# Patient Record
Sex: MALE | Race: WHITE | NOT HISPANIC OR LATINO | Employment: OTHER | URBAN - METROPOLITAN AREA
[De-identification: names, ages, dates, MRNs, and addresses within clinical notes are randomized per-mention and may not be internally consistent; named-entity substitution may affect disease eponyms.]

---

## 2020-10-04 ENCOUNTER — HOSPITAL ENCOUNTER (EMERGENCY)
Facility: CLINIC | Age: 75
Discharge: HOME | End: 2020-10-04
Attending: EMERGENCY MEDICINE | Admitting: EMERGENCY MEDICINE
Payer: COMMERCIAL

## 2020-10-04 ENCOUNTER — APPOINTMENT (OUTPATIENT)
Dept: MRI IMAGING | Facility: CLINIC | Age: 75
End: 2020-10-04
Attending: EMERGENCY MEDICINE
Payer: COMMERCIAL

## 2020-10-04 VITALS
TEMPERATURE: 97.7 F | SYSTOLIC BLOOD PRESSURE: 139 MMHG | OXYGEN SATURATION: 93 % | RESPIRATION RATE: 15 BRPM | HEART RATE: 82 BPM | DIASTOLIC BLOOD PRESSURE: 78 MMHG

## 2020-10-04 DIAGNOSIS — R42 VERTIGO: ICD-10-CM

## 2020-10-04 LAB
ANION GAP SERPL CALCULATED.3IONS-SCNC: 6 MMOL/L (ref 3–14)
BUN SERPL-MCNC: 25 MG/DL (ref 7–30)
CALCIUM SERPL-MCNC: 8.6 MG/DL (ref 8.5–10.1)
CHLORIDE SERPL-SCNC: 106 MMOL/L (ref 94–109)
CO2 SERPL-SCNC: 27 MMOL/L (ref 20–32)
CREAT SERPL-MCNC: 1.05 MG/DL (ref 0.66–1.25)
ERYTHROCYTE [DISTWIDTH] IN BLOOD BY AUTOMATED COUNT: 11.6 % (ref 10–15)
GFR SERPL CREATININE-BSD FRML MDRD: 69 ML/MIN/{1.73_M2}
GLUCOSE SERPL-MCNC: 141 MG/DL (ref 70–99)
HCT VFR BLD AUTO: 43.5 % (ref 40–53)
HGB BLD-MCNC: 14.6 G/DL (ref 13.3–17.7)
MCH RBC QN AUTO: 30.4 PG (ref 26.5–33)
MCHC RBC AUTO-ENTMCNC: 33.6 G/DL (ref 31.5–36.5)
MCV RBC AUTO: 90 FL (ref 78–100)
PLATELET # BLD AUTO: 176 10E9/L (ref 150–450)
POTASSIUM SERPL-SCNC: 4 MMOL/L (ref 3.4–5.3)
RBC # BLD AUTO: 4.81 10E12/L (ref 4.4–5.9)
SODIUM SERPL-SCNC: 137 MMOL/L (ref 133–144)
WBC # BLD AUTO: 10.5 10E9/L (ref 4–11)

## 2020-10-04 PROCEDURE — 99285 EMERGENCY DEPT VISIT HI MDM: CPT | Mod: 25

## 2020-10-04 PROCEDURE — 80048 BASIC METABOLIC PNL TOTAL CA: CPT | Performed by: EMERGENCY MEDICINE

## 2020-10-04 PROCEDURE — 250N000013 HC RX MED GY IP 250 OP 250 PS 637: Performed by: EMERGENCY MEDICINE

## 2020-10-04 PROCEDURE — 70549 MR ANGIOGRAPH NECK W/O&W/DYE: CPT

## 2020-10-04 PROCEDURE — 93005 ELECTROCARDIOGRAM TRACING: CPT

## 2020-10-04 PROCEDURE — 70553 MRI BRAIN STEM W/O & W/DYE: CPT | Mod: 59

## 2020-10-04 PROCEDURE — 96361 HYDRATE IV INFUSION ADD-ON: CPT

## 2020-10-04 PROCEDURE — 96374 THER/PROPH/DIAG INJ IV PUSH: CPT

## 2020-10-04 PROCEDURE — 85027 COMPLETE CBC AUTOMATED: CPT | Performed by: EMERGENCY MEDICINE

## 2020-10-04 PROCEDURE — 250N000011 HC RX IP 250 OP 636: Performed by: EMERGENCY MEDICINE

## 2020-10-04 PROCEDURE — A9585 GADOBUTROL INJECTION: HCPCS | Performed by: EMERGENCY MEDICINE

## 2020-10-04 PROCEDURE — 70544 MR ANGIOGRAPHY HEAD W/O DYE: CPT

## 2020-10-04 PROCEDURE — 255N000002 HC RX 255 OP 636: Performed by: EMERGENCY MEDICINE

## 2020-10-04 PROCEDURE — 96375 TX/PRO/DX INJ NEW DRUG ADDON: CPT

## 2020-10-04 PROCEDURE — 36415 COLL VENOUS BLD VENIPUNCTURE: CPT | Performed by: EMERGENCY MEDICINE

## 2020-10-04 PROCEDURE — 258N000003 HC RX IP 258 OP 636: Performed by: EMERGENCY MEDICINE

## 2020-10-04 RX ORDER — GADOBUTROL 604.72 MG/ML
10 INJECTION INTRAVENOUS ONCE
Status: COMPLETED | OUTPATIENT
Start: 2020-10-04 | End: 2020-10-04

## 2020-10-04 RX ORDER — MECLIZINE HYDROCHLORIDE 25 MG/1
25 TABLET ORAL ONCE
Status: COMPLETED | OUTPATIENT
Start: 2020-10-04 | End: 2020-10-04

## 2020-10-04 RX ORDER — ONDANSETRON 4 MG/1
4 TABLET, ORALLY DISINTEGRATING ORAL EVERY 6 HOURS PRN
Qty: 15 TABLET | Refills: 0 | Status: SHIPPED | OUTPATIENT
Start: 2020-10-04 | End: 2020-10-07

## 2020-10-04 RX ORDER — ONDANSETRON 2 MG/ML
8 INJECTION INTRAMUSCULAR; INTRAVENOUS ONCE
Status: COMPLETED | OUTPATIENT
Start: 2020-10-04 | End: 2020-10-04

## 2020-10-04 RX ORDER — MECLIZINE HYDROCHLORIDE 25 MG/1
25 TABLET ORAL 3 TIMES DAILY PRN
Qty: 15 TABLET | Refills: 0 | Status: SHIPPED | OUTPATIENT
Start: 2020-10-04

## 2020-10-04 RX ORDER — LORAZEPAM 2 MG/ML
1 INJECTION INTRAMUSCULAR ONCE
Status: COMPLETED | OUTPATIENT
Start: 2020-10-04 | End: 2020-10-04

## 2020-10-04 RX ADMIN — GADOBUTROL 10 ML: 604.72 INJECTION INTRAVENOUS at 12:24

## 2020-10-04 RX ADMIN — MECLIZINE HYDROCHLORIDE 25 MG: 25 TABLET ORAL at 11:26

## 2020-10-04 RX ADMIN — ONDANSETRON 8 MG: 2 INJECTION INTRAMUSCULAR; INTRAVENOUS at 11:06

## 2020-10-04 RX ADMIN — SODIUM CHLORIDE 1000 ML: 9 INJECTION, SOLUTION INTRAVENOUS at 11:06

## 2020-10-04 RX ADMIN — LORAZEPAM 1 MG: 2 INJECTION INTRAMUSCULAR; INTRAVENOUS at 11:07

## 2020-10-04 ASSESSMENT — ENCOUNTER SYMPTOMS
NAUSEA: 1
VOMITING: 1
DIARRHEA: 0
COUGH: 0

## 2020-10-04 NOTE — ED TRIAGE NOTES
A&O x4, ABCs intact. Pt presents with SOB and dizziness that started at 0700 this morning. Pt reports throwing up around 8. Pt noted to be hyperventilating in triage. Pt encouraged to slow breathing. Pt denies chest pain.     Spine appears normal, range of motion is not limited, no muscle or joint tenderness

## 2020-10-04 NOTE — ED PROVIDER NOTES
History     Chief Complaint:  Dizziness     HPI   Kevin Alfonso is a 75 year old male who presents with primary complaints of dizziness.  He reports that he awoke around 8 AM this morning with dizziness.  He describes his dizziness as the room is spinning.  The dizziness is exacerbated by extraocular movement and position change.  He became nauseated and vomited.  He denies any associated numbness, weakness, vision changes, problems swallowing or speaking.  His symptoms have been constant although aggravated with movement.  Nurse's triage notes remarked that the patient is short of breath.  He denies dyspnea to me but states that he was breathing heavier due to the dizziness.  He denies any new hearing loss tinnitus, recent head or neck trauma, chiropractor manipulation of the neck..    Allergies:  Atorvastatin  Codeine  Dextrin  Lactose     Medications:    Zocor    Past Medical History:    Hyperlipidemia   BPH without urinary obstruction  Mitral late systolic murmur  Colonic polyps  Diverticulosis of colon  Macular degeneration    Past Surgical History:    Hernia repair      Family History:    Cancer  Hyperlipidemia      Social History:  Smoking Status: Never Smoker  Smokeless Tobacco: Never Used  Alcohol Use: No    Review of Systems   Respiratory: Negative for cough.    Cardiovascular: Negative for chest pain.   Gastrointestinal: Positive for nausea and vomiting. Negative for diarrhea.   Skin: Negative for rash.   All other systems reviewed and are negative.      Physical Exam     Patient Vitals for the past 24 hrs:   BP Temp Temp src Pulse Resp SpO2   10/04/20 1250 -- -- -- 82 15 93 %   10/04/20 1245 -- -- -- 83 21 93 %   10/04/20 1240 -- -- -- 75 15 92 %   10/04/20 1235 -- -- -- 73 16 --   10/04/20 1130 139/78 -- -- -- -- --   10/04/20 1120 -- -- -- 84 -- --   10/04/20 1115 (!) 141/79 -- -- 92 -- --   10/04/20 1110 -- -- -- 80 -- --   10/04/20 1105 -- -- -- 80 -- --   10/04/20 1100 128/69 -- -- 74 -- --    10/04/20 1050 -- -- -- 73 -- 97 %   10/04/20 1000 (!) 145/74 -- -- 63 20 96 %   10/04/20 0947 121/79 97.7  F (36.5  C) Temporal 72 20 100 %        Physical Exam    Constitutional:  Pleasant, age appropriate male lying still in the bed with his eyes closed  HEENT:    Tympanic membranes are clear and without effusion.     External auditory canals without cerumen impaction.     Oropharynx is moist, without lesions or trismus.  Eyes:    Conjunctiva normal, PERRL     Extra-ocular movements intact.     No nystagmus  Neck:    Supple, no meningismus.     CV:     Regular rate and rhythm.      No murmurs, rubs or gallops.     2+ radial pulses bilateral.       No lower extremity edema.  PULM:    Clear to auscultation bilateral.       No respiratory distress.      Good air exchange.     No rales or wheezing.  ABD:    Soft, non-tender, non-distended.       No pulsatile masses.       No rebound, guarding or rigidity.  MSK:     No gross deformity to all four extremities.   LYMPH:   No cervical lymphadenopathy.  NEURO:   Alert and oriented x 3.      Cranial nerves II-XII intact.     Strength is 5/5 in all 4 extremities.     Sensation intact to all 4 extremities.     Finger to nose normal bilateral     No clonus, down-going Babinski bilateral.     Test of skew normal.  Skin:    Warm, dry and intact.    Psych:    Mildly anxious      Emergency Department Course     ECG:  ECG taken at 0959  Sinus rhythm with sinus arrhythmia with 1st degree A-V block  Prolonged QT  Abnormal ECG  No previous ECGs available  Vent. rate 63 BPM  OH interval 214 ms  QRS duration 102 ms  QT/QTc 488/499 ms  P-R-T axes 46 61 55    Imaging:  Radiology findings were communicated with the patient who voiced understanding of the findings.    MR Brain w/o & w Contrast  Diffuse cerebral volume loss and cerebral white matter  changes consistent with chronic small vessel ischemic disease. No  evidence for acute intracranial pathology.  Reading per radiology    MRA  Neck (Carotids) wo & w Contrast  Normal MR angiogram of the neck.   Reading per radiology    MRA Brain ([place]) wo Contrast   Normal MR angiogram of the head.    Reading per radiology     Laboratory:  Laboratory findings were communicated with the patient who voiced understanding of the findings.    CBC: WBC 10.5, HGB 14.6,   BMP:  (H) o/w WNL (Creatinine 1.05)    Procedures    Interventions:  1107 Ativan 1 mg IV   Zofran 8 mg IV    NS 1 L IV  1126 Antivert 25 mg Oral     Emergency Department Course:    0949 Nursing notes and vitals reviewed.     I performed an exam of the patient as documented above.     1130 IV was inserted and blood was drawn for laboratory testing, results above.     1134 The patient was sent for a MR while in the emergency department, results above.      1255 Patient now without residual dizziness. Gait is stable.     Findings and plan explained to the Patient. Patient discharged home with instructions regarding supportive care, medications, and reasons to return. The importance of close follow-up was reviewed.     Impression & Plan          Medical Decision Making:  Kevin Alfonso is a 75 year old male who presents to the emergency department today for evaluation of dizziness.  His evaluation was most consistent with benign positional peripheral vertigo but given advanced age, laboratory studies were undertaken as well as MRI/MRA of the head and neck.  There is no acute pathology noted on radiographic studies.  Patient felt remarkably improved with interventions as described above.  Patient safe for discharge home with PRN meclizine and Zofran.  Close follow-up with PCP and return to the ED for any worsening symptoms.      Diagnosis:    ICD-10-CM    1. Vertigo  R42      Disposition:   The patient is discharged to home.     Discharge Medications:  New Prescriptions    MECLIZINE (ANTIVERT) 25 MG TABLET    Take 1 tablet (25 mg) by mouth 3 times daily as needed for dizziness     ONDANSETRON (ZOFRAN ODT) 4 MG ODT TAB    Take 1 tablet (4 mg) by mouth every 6 hours as needed for nausea       Scribe Disclosure:  I, Hebert Crook, am serving as a scribe at 9:50 AM on 10/4/2020 to document services personally performed by Geoffrey Zambrano MD based on my observations and the provider's statements to me.        Geoffrey Zambrano MD  10/06/20 5812

## 2020-10-04 NOTE — DISCHARGE INSTRUCTIONS
Please make an appointment to follow up with your primary care provider in 3-5 days even if entirely better.      Discharge Instructions  Vertigo  You have been diagnosed with vertigo.  This is a dizzy feeling often described as spinning or that the room is moving around you. You will often have nausea (sick to your stomach), vomiting (throwing up), and balance problems with it.  Vertigo is usually caused by a problem in the inner ear which helps control your balance.  Many things can cause vertigo, including calcium collections in the inner ear, a virus infection of the inner ear, concussion, migraine, and some medicines.  Luckily, these causes are not life threatening and will eventually go away.  However, sometimes there is a serious problem that does not show up right away.  Generally, every Emergency Department visit should have a follow-up clinic visit with either a primary or a specialty clinic/provider. Please follow-up as instructed by your emergency provider today.  Return to the Emergency Department if you have:  New or severe headache.  Double vision (seeing two of things).  Trouble speaking or hearing.  Weakness or trouble moving/using one side of your body.  Passing out.  Numbness or tingling.  Chest pain.  Vomiting that will not stop.    Treatment:  There are several commonly prescribed medications:  Antihistamines such as meclizine (Antivert ), dimenhydrinate (Dramamine ), or diphenhydramine (Benadryl ).  Prescription anti-nausea medicines, such as promethazine (Phenergan ), metoclopramide (Reglan ), or ondansetron (Zofran ).  Prescription sedative medicines, such as diazepam (Valium ), lorazepam (Ativan ), or clonazepam (Klonopin ).  Most of these medicines make you sleepy, and you should not take them before you work or drive. You should only take prescription medicines to treat severe vertigo symptoms, and you should stop the medicine when your symptoms improve.    Follow Up:  If you have vertigo  longer than three days, it is important that you follow up either with your primary provider or an Ear, Nose, and Throat (ENT) specialist.  You may need further testing to evaluate your vertigo and you may also need  vestibular  therapy which is a special form of physical therapy to make the vertigo go away.    If you were given a prescription for medicine here today, be sure to read all of the information (including the package insert) that comes with your prescription.  This will include important information about the medicine, its side effects, and any warnings that you need to know about.  The pharmacist who fills the prescription can provide more information and answer questions you may have about the medicine.  If you have questions or concerns that the pharmacist cannot address, please call or return to the Emergency Department.     Remember that you can always come back to the Emergency Department if you are not able to see your regular provider in the amount of time listed above, if you get any new symptoms, or if there is anything that worries you.

## 2020-10-04 NOTE — ED AVS SNAPSHOT
[facility] Emergency Dept  [address]  [facility] [address]  Phone: [phone]  Fax: [phone]                                    Kevin Alfonso   MRN: [id number]    Department: [facility] Emergency Dept   Date of Visit: 10/4/2020           After Visit Summary Signature Page    I have received my discharge instructions, and my questions have been answered. I have discussed any challenges I see with this plan with the nurse or doctor.    ..........................................................................................................................................  Patient/Patient Representative Signature      ..........................................................................................................................................  Patient Representative Print Name and Relationship to Patient    ..................................................               ................................................  Date                                   Time    ..........................................................................................................................................  Reviewed by Signature/Title    ...................................................              ..............................................  Date                                               Time          22EPIC Rev 08/18

## 2020-10-04 NOTE — ED NOTES
Pt ambulated independently without assistance. Pt reports no increase in shortness of breath or dizziness while walking. MD notified

## 2020-10-05 LAB — INTERPRETATION ECG - MUSE: NORMAL

## 2023-04-08 ENCOUNTER — HOSPITAL ENCOUNTER (EMERGENCY)
Facility: CLINIC | Age: 78
Discharge: LEFT BEFORE DISCHARGE | End: 2023-04-08
Payer: COMMERCIAL

## 2023-04-08 VITALS
OXYGEN SATURATION: 98 % | TEMPERATURE: 98.1 F | RESPIRATION RATE: 18 BRPM | SYSTOLIC BLOOD PRESSURE: 123 MMHG | HEART RATE: 84 BPM | DIASTOLIC BLOOD PRESSURE: 88 MMHG

## 2023-04-08 NOTE — ED NOTES
Patient's daughter up to desk asking if they need to stay, writer explained that if they would like to see a doctor they would have to wait, daughter continued to ask if they needed to be here, writer stated that it was up to them if they wanted to be seen or not.

## 2023-04-08 NOTE — ED TRIAGE NOTES
Patient states he has had a rapid heart rate since 8am. Rate is 83 in triage, patient states when he took it at home it was between 89 and 93. Patient denies chest pain, palpitations, and SOB     Triage Assessment     Row Name 04/08/23 1123       Triage Assessment (Adult)    Airway WDL WDL       Respiratory WDL    Respiratory WDL WDL       Skin Circulation/Temperature WDL    Skin Circulation/Temperature WDL WDL       Cardiac WDL    Cardiac WDL WDL       Peripheral/Neurovascular WDL    Peripheral Neurovascular WDL WDL       Cognitive/Neuro/Behavioral WDL    Cognitive/Neuro/Behavioral WDL WDL